# Patient Record
Sex: MALE | Race: WHITE | NOT HISPANIC OR LATINO | Employment: STUDENT | URBAN - METROPOLITAN AREA
[De-identification: names, ages, dates, MRNs, and addresses within clinical notes are randomized per-mention and may not be internally consistent; named-entity substitution may affect disease eponyms.]

---
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
## 2023-02-12 ENCOUNTER — HEALTH MAINTENANCE LETTER (OUTPATIENT)
Age: 18
End: 2023-02-12

## 2024-03-10 ENCOUNTER — OFFICE VISIT (OUTPATIENT)
Dept: URGENT CARE | Facility: URGENT CARE | Age: 19
End: 2024-03-10
Payer: COMMERCIAL

## 2024-03-10 ENCOUNTER — HEALTH MAINTENANCE LETTER (OUTPATIENT)
Age: 19
End: 2024-03-10

## 2024-03-10 VITALS
SYSTOLIC BLOOD PRESSURE: 134 MMHG | BODY MASS INDEX: 20.64 KG/M2 | HEART RATE: 70 BPM | RESPIRATION RATE: 18 BRPM | WEIGHT: 148 LBS | TEMPERATURE: 97.9 F | OXYGEN SATURATION: 100 % | DIASTOLIC BLOOD PRESSURE: 79 MMHG

## 2024-03-10 DIAGNOSIS — J10.1 INFLUENZA A: ICD-10-CM

## 2024-03-10 DIAGNOSIS — J06.9 UPPER RESPIRATORY TRACT INFECTION, UNSPECIFIED TYPE: Primary | ICD-10-CM

## 2024-03-10 LAB
FLUAV AG SPEC QL IA: POSITIVE
FLUBV AG SPEC QL IA: NEGATIVE

## 2024-03-10 PROCEDURE — 87804 INFLUENZA ASSAY W/OPTIC: CPT | Performed by: FAMILY MEDICINE

## 2024-03-10 PROCEDURE — 87635 SARS-COV-2 COVID-19 AMP PRB: CPT | Performed by: FAMILY MEDICINE

## 2024-03-10 PROCEDURE — 99203 OFFICE O/P NEW LOW 30 MIN: CPT | Performed by: FAMILY MEDICINE

## 2024-03-10 NOTE — PATIENT INSTRUCTIONS
You must go 24 hours without fever before returning to work or school    Tylenol or ibuprofen every 6 hours as needed for discomfort    If you develop a new fever or worsening cough or difficulty breathing please seek help right away    For cough (can take all of them together):   - Dextromethorphan 'DM' (over the counter - can be found in Robitussin/Delsym/generic cough meds)  - Honey: lozenges/cough drops or mix honey in warm water

## 2024-03-10 NOTE — PROGRESS NOTES
Assessment & Plan     Upper respiratory tract infection, unspecified type  - Influenza A & B Antigen - Clinic Collect  - Symptomatic COVID-19 Virus (Coronavirus) by PCR Nose    Influenza A       Patient testing positive for influenza A.  At this time he does not meet criteria for antiviral therapy.  Vital signs reassuring.  Lung exam unremarkable and ear exam does not reveal any evidence of acute otitis media.  I did recommend he use Sudafed for a few days to help with his ear pressure.  Over-the-counter cough remedies to help with respiratory symptoms.  Follow-up as needed if symptoms worsen  See AVS summary for additional recommendations reviewed with patient during this visit.       Jose Mcginnis MD   [facility]    Corbin Coronado is a 18 year old male who presents to clinic today for the following health issues:  Chief Complaint   Patient presents with    Cough     Headache x 4 days gone now, cough x 2-3 days, sinus pressure and clogged ears    Fever     HPI    Patient reports symptom started on Wednesday with a mild headache along with intermittent cough which is productive.  He has not had any fevers in the last few days.  Experiencing some clogged ears bilaterally taking Tylenol for his discomfort.  No history of recurrent sinusitis.  Denies any history of breathing difficulties.    There are no problems to display for this patient.      Current Outpatient Medications   Medication    diclofenac (VOLTAREN) 75 MG EC tablet    meloxicam (MOBIC) 15 MG tablet     Current Facility-Administered Medications   Medication    lidocaine (PF) (XYLOCAINE) 1 % injection 1 mL    methylPREDNISolone (DEPO-MEDROL) injection 40 mg           Objective    /79   Pulse 70   Temp 97.9  F (36.6  C)   Resp 18   Wt 67.1 kg (148 lb)   SpO2 100%   BMI 20.64 kg/m    Physical Exam       Ears: soft cerumen mild bilaterally, normal TMs  CV: HDS  Pulm: clear bilaterally    Results for orders placed or performed  in visit on 03/10/24   Influenza A & B Antigen - Clinic Collect     Status: Abnormal    Specimen: Nose; Swab   Result Value Ref Range    Influenza A antigen Positive (A) Negative    Influenza B antigen Negative Negative    Narrative    Test results must be correlated with clinical data. If necessary, results should be confirmed by a molecular assay or viral culture.                     The use of [employer] services may have been used to construct the content in this note; any grammatical or spelling errors are non-intentional. Please contact the author of this note directly if you are in need of any clarification.

## 2024-03-11 LAB — SARS-COV-2 RNA RESP QL NAA+PROBE: NEGATIVE

## 2024-10-01 ENCOUNTER — ANCILLARY PROCEDURE (OUTPATIENT)
Dept: ULTRASOUND IMAGING | Facility: CLINIC | Age: 19
End: 2024-10-01
Attending: UROLOGY
Payer: COMMERCIAL

## 2024-10-01 DIAGNOSIS — I86.1 SCROTAL VARICES: ICD-10-CM

## 2024-10-01 PROCEDURE — 76870 US EXAM SCROTUM: CPT | Mod: GC | Performed by: RADIOLOGY

## (undated) RX ORDER — LIDOCAINE HYDROCHLORIDE 10 MG/ML
INJECTION, SOLUTION EPIDURAL; INFILTRATION; INTRACAUDAL; PERINEURAL
Status: DISPENSED
Start: 2022-04-28

## (undated) RX ORDER — METHYLPREDNISOLONE ACETATE 40 MG/ML
INJECTION, SUSPENSION INTRA-ARTICULAR; INTRALESIONAL; INTRAMUSCULAR; SOFT TISSUE
Status: DISPENSED
Start: 2022-04-28